# Patient Record
(demographics unavailable — no encounter records)

---

## 2017-08-25 NOTE — NUR
Patient discharged to home in stable conditon.  Written and verbal after care 
instructions given. 

Patient verbalizes understanding of instructions. Ambulated from ER with stable 
gait. All patient needs attended and met. Call light is within reach.

## 2017-08-25 NOTE — NUR
Patient educated not to drive due to recent opiate intake. Patient will 
ambulate home by foot accompanied by mother.

## 2018-09-08 NOTE — NUR
Patient discharged to home in stable conditon.  Written and verbal after care 
instructions given. 

Patient verbalizes understanding of instructions. Patient able to ambulate 
unassisted with a steady gait. Patient left with all personal belongings.